# Patient Record
Sex: MALE | Race: WHITE | NOT HISPANIC OR LATINO | Employment: STUDENT | URBAN - METROPOLITAN AREA
[De-identification: names, ages, dates, MRNs, and addresses within clinical notes are randomized per-mention and may not be internally consistent; named-entity substitution may affect disease eponyms.]

---

## 2023-10-05 ENCOUNTER — HOSPITAL ENCOUNTER (EMERGENCY)
Facility: HOSPITAL | Age: 18
Discharge: HOME | End: 2023-10-05
Payer: COMMERCIAL

## 2023-10-05 VITALS
OXYGEN SATURATION: 98 % | SYSTOLIC BLOOD PRESSURE: 147 MMHG | WEIGHT: 185 LBS | RESPIRATION RATE: 18 BRPM | TEMPERATURE: 97.5 F | HEART RATE: 68 BPM | DIASTOLIC BLOOD PRESSURE: 77 MMHG | BODY MASS INDEX: 27.4 KG/M2 | HEIGHT: 69 IN

## 2023-10-05 DIAGNOSIS — L05.01 PILONIDAL CYST WITH ABSCESS: Primary | ICD-10-CM

## 2023-10-05 PROCEDURE — 99283 EMERGENCY DEPT VISIT LOW MDM: CPT | Mod: 25

## 2023-10-05 PROCEDURE — 2500000005 HC RX 250 GENERAL PHARMACY W/O HCPCS: Performed by: NURSE PRACTITIONER

## 2023-10-05 PROCEDURE — 10080 I&D PILONIDAL CYST SIMPLE: CPT | Performed by: NURSE PRACTITIONER

## 2023-10-05 PROCEDURE — 87185 SC STD ENZYME DETCJ PER NZM: CPT | Mod: CMCLAB,PARLAB | Performed by: NURSE PRACTITIONER

## 2023-10-05 PROCEDURE — 99283 EMERGENCY DEPT VISIT LOW MDM: CPT | Mod: 25 | Performed by: NURSE PRACTITIONER

## 2023-10-05 RX ORDER — DOXYCYCLINE HYCLATE 100 MG
100 TABLET ORAL ONCE
Status: DISCONTINUED | OUTPATIENT
Start: 2023-10-05 | End: 2023-10-06 | Stop reason: HOSPADM

## 2023-10-05 RX ORDER — LIDOCAINE HYDROCHLORIDE AND EPINEPHRINE 10; 10 MG/ML; UG/ML
5 INJECTION, SOLUTION INFILTRATION; PERINEURAL ONCE
Status: COMPLETED | OUTPATIENT
Start: 2023-10-05 | End: 2023-10-05

## 2023-10-05 RX ORDER — DOXYCYCLINE 100 MG/1
100 CAPSULE ORAL 2 TIMES DAILY
Qty: 20 CAPSULE | Refills: 0 | Status: SHIPPED | OUTPATIENT
Start: 2023-10-05 | End: 2023-10-15

## 2023-10-05 RX ORDER — OXYCODONE AND ACETAMINOPHEN 5; 325 MG/1; MG/1
1 TABLET ORAL ONCE
Status: DISCONTINUED | OUTPATIENT
Start: 2023-10-05 | End: 2023-10-06 | Stop reason: HOSPADM

## 2023-10-05 RX ADMIN — LIDOCAINE HYDROCHLORIDE,EPINEPHRINE BITARTRATE 5 ML: 10; .01 INJECTION, SOLUTION INFILTRATION; PERINEURAL at 22:10

## 2023-10-05 ASSESSMENT — LIFESTYLE VARIABLES
HAVE PEOPLE ANNOYED YOU BY CRITICIZING YOUR DRINKING: NO
HAVE YOU EVER FELT YOU SHOULD CUT DOWN ON YOUR DRINKING: NO
EVER FELT BAD OR GUILTY ABOUT YOUR DRINKING: NO
EVER HAD A DRINK FIRST THING IN THE MORNING TO STEADY YOUR NERVES TO GET RID OF A HANGOVER: NO

## 2023-10-05 ASSESSMENT — PAIN SCALES - GENERAL: PAINLEVEL_OUTOF10: 1

## 2023-10-05 ASSESSMENT — COLUMBIA-SUICIDE SEVERITY RATING SCALE - C-SSRS
6. HAVE YOU EVER DONE ANYTHING, STARTED TO DO ANYTHING, OR PREPARED TO DO ANYTHING TO END YOUR LIFE?: NO
1. IN THE PAST MONTH, HAVE YOU WISHED YOU WERE DEAD OR WISHED YOU COULD GO TO SLEEP AND NOT WAKE UP?: NO
2. HAVE YOU ACTUALLY HAD ANY THOUGHTS OF KILLING YOURSELF?: NO

## 2023-10-05 ASSESSMENT — PAIN DESCRIPTION - PROGRESSION: CLINICAL_PROGRESSION: NOT CHANGED

## 2023-10-05 ASSESSMENT — PAIN - FUNCTIONAL ASSESSMENT: PAIN_FUNCTIONAL_ASSESSMENT: 0-10

## 2023-10-06 NOTE — ED PROVIDER NOTES
HPI   Chief Complaint   Patient presents with    Abscess     Pt has abscess at sacrum       Patient is a 18-year-old male who presents ED today due to a painful lump near his gluteal cleft.  Patient states that started about 3 days ago and became exquisitely painful over the past 2 days.  Patient is taking dual action ibuprofen/Tylenol with limited relief.  Patient thinks he might have a pilonidal cyst.  Patient denies any fevers, chills, or previous episodes of this.       used: No                        No data recorded                Patient History   No past medical history on file.  No past surgical history on file.  No family history on file.  Social History     Tobacco Use    Smoking status: Not on file    Smokeless tobacco: Not on file   Substance Use Topics    Alcohol use: Not on file    Drug use: Not on file       Physical Exam   ED Triage Vitals   Temp Heart Rate Resp BP   10/05/23 2114 10/05/23 2114 10/05/23 2114 10/05/23 2117   36.4 °C (97.5 °F) 68 18 147/77      SpO2 Temp src Heart Rate Source Patient Position   10/05/23 2114 -- -- --   98 %         BP Location FiO2 (%)     -- --             Physical Exam  Vitals and nursing note reviewed.   Constitutional:       General: He is not in acute distress.     Appearance: He is well-developed.   HENT:      Head: Normocephalic and atraumatic.   Eyes:      Conjunctiva/sclera: Conjunctivae normal.   Cardiovascular:      Rate and Rhythm: Normal rate and regular rhythm.      Heart sounds: No murmur heard.  Pulmonary:      Effort: Pulmonary effort is normal. No respiratory distress.      Breath sounds: Normal breath sounds.   Abdominal:      Palpations: Abdomen is soft.      Tenderness: There is no abdominal tenderness.   Musculoskeletal:         General: No swelling.      Cervical back: Neck supple.   Skin:     General: Skin is warm and dry.      Capillary Refill: Capillary refill takes less than 2 seconds.             Comments: 1 cm x 2 and  half centimeter ovoid area of erythema with fluctuance, does not appear to extend to the anal region, appears fairly superficial.   Neurological:      Mental Status: He is alert.   Psychiatric:         Mood and Affect: Mood normal.       ED Course & MDM   Diagnoses as of 10/05/23 2243   Pilonidal cyst with abscess       Medical Decision Making  Differential diagnosis: Pilonidal cyst, infected pilonidal cyst, folliculitis, abscess, cellulitis.    Amount and/or Complexity of Data Reviewed  Labs: ordered.     Details: Abscess culture pending    Risk  OTC drugs.  Prescription drug management.      Procedure  Incision and Drainage    Performed by: JAYY Simpson  Authorized by: JYAY Simpson    Consent:     Consent obtained:  Verbal    Consent given by:  Patient    Risks, benefits, and alternatives were discussed: yes      Risks discussed:  Bleeding, incomplete drainage, pain, damage to other organs and infection    Alternatives discussed:  No treatment, delayed treatment, alternative treatment, observation and referral  Universal protocol:     Procedure explained and questions answered to patient or proxy's satisfaction: yes      Relevant documents present and verified: yes      Test results available : yes      Required blood products, implants, devices, and special equipment available: yes      Site/side marked: yes      Immediately prior to procedure, a time out was called: yes      Patient identity confirmed:  Verbally with patient and arm band  Location:     Type:  Pilonidal cyst (Infected)    Size:  See note    Location:  Trunk    Trunk location:  Back  Pre-procedure details:     Skin preparation:  Chlorhexidine  Anesthesia:     Anesthesia method:  Local infiltration    Local anesthetic:  Lidocaine 1% WITH epi  Procedure type:     Complexity:  Simple  Procedure details:     Ultrasound guidance: no      Incision types:  Stab incision    Incision depth:  Dermal    Wound management:  Probed and  deloculated    Drainage:  Bloody and purulent    Drainage amount:  Moderate    Wound treatment:  Wound left open    Packing materials:  1/4 in iodoform gauze  Post-procedure details:     Procedure completion:  Tolerated well, no immediate complications       GENESIS Simpson-CNP  10/05/23 2279

## 2023-10-07 ENCOUNTER — HOSPITAL ENCOUNTER (EMERGENCY)
Facility: HOSPITAL | Age: 18
Discharge: HOME | End: 2023-10-07
Payer: COMMERCIAL

## 2023-10-07 VITALS
DIASTOLIC BLOOD PRESSURE: 72 MMHG | TEMPERATURE: 97.5 F | HEART RATE: 75 BPM | BODY MASS INDEX: 27.4 KG/M2 | WEIGHT: 185 LBS | OXYGEN SATURATION: 99 % | HEIGHT: 69 IN | RESPIRATION RATE: 18 BRPM | SYSTOLIC BLOOD PRESSURE: 136 MMHG

## 2023-10-07 DIAGNOSIS — Z51.89 VISIT FOR WOUND CHECK: Primary | ICD-10-CM

## 2023-10-07 PROCEDURE — 99281 EMR DPT VST MAYX REQ PHY/QHP: CPT

## 2023-10-07 ASSESSMENT — COLUMBIA-SUICIDE SEVERITY RATING SCALE - C-SSRS
2. HAVE YOU ACTUALLY HAD ANY THOUGHTS OF KILLING YOURSELF?: NO
6. HAVE YOU EVER DONE ANYTHING, STARTED TO DO ANYTHING, OR PREPARED TO DO ANYTHING TO END YOUR LIFE?: NO
1. IN THE PAST MONTH, HAVE YOU WISHED YOU WERE DEAD OR WISHED YOU COULD GO TO SLEEP AND NOT WAKE UP?: NO

## 2023-10-07 ASSESSMENT — PAIN SCALES - GENERAL
PAINLEVEL_OUTOF10: 0 - NO PAIN
PAINLEVEL_OUTOF10: 3

## 2023-10-07 ASSESSMENT — PAIN - FUNCTIONAL ASSESSMENT: PAIN_FUNCTIONAL_ASSESSMENT: 0-10

## 2023-10-08 LAB
B-LACTAMASE ORGANISM ISLT: NEGATIVE
BACTERIA SPEC CULT: ABNORMAL
BACTERIA SPEC CULT: ABNORMAL
GRAM STN SPEC: ABNORMAL
GRAM STN SPEC: ABNORMAL

## 2023-10-08 NOTE — ED PROVIDER NOTES
HPI   Chief Complaint   Patient presents with    Wound Check     Pt was here two days ago and had a cyst on his buttock drained and packed, was advised to come here if unable to receive care elsewhere.        18-year-old male here today for wound check.  The patient states that he had an abscess drained and packed on the fifth of this month.  He reports that he was instructed to have the packing removed in the next 2 days.  Reports he was not able to get into an urgent care or his primary care physician's office.  He states improvement of his symptoms and continues to take antibiotics       used: No                        No data recorded                Patient History   No past medical history on file.  No past surgical history on file.  No family history on file.  Social History     Tobacco Use    Smoking status: Not on file    Smokeless tobacco: Not on file   Substance Use Topics    Alcohol use: Not on file    Drug use: Not on file       Physical Exam   ED Triage Vitals [10/07/23 2044]   Temp Heart Rate Resp BP   36.3 °C (97.3 °F) 85 20 164/81      SpO2 Temp Source Heart Rate Source Patient Position   97 % Temporal Monitor Sitting      BP Location FiO2 (%)     Right arm --       Physical Exam  Vitals and nursing note reviewed.   Constitutional:       General: He is not in acute distress.     Appearance: Normal appearance. He is normal weight. He is not ill-appearing, toxic-appearing or diaphoretic.   HENT:      Head: Normocephalic.      Nose: Nose normal.      Mouth/Throat:      Mouth: Mucous membranes are moist.   Eyes:      Extraocular Movements: Extraocular movements intact.      Conjunctiva/sclera: Conjunctivae normal.   Cardiovascular:      Rate and Rhythm: Normal rate and regular rhythm.      Pulses: Normal pulses.   Pulmonary:      Effort: Pulmonary effort is normal. No respiratory distress.      Breath sounds: Normal breath sounds.   Musculoskeletal:         General: Normal range of  motion.      Cervical back: Normal range of motion and neck supple.   Skin:     General: Skin is warm and dry.      Capillary Refill: Capillary refill takes less than 2 seconds.      Comments: Area of slight induration without fluctuance to the gluteal cleft   Neurological:      General: No focal deficit present.      Mental Status: He is alert and oriented to person, place, and time.   Psychiatric:         Mood and Affect: Mood normal.         Behavior: Behavior normal.         Thought Content: Thought content normal.         Judgment: Judgment normal.         ED Course & MDM   Diagnoses as of 10/07/23 2117   Visit for wound check       Medical Decision Making  Patient's wound appears to be healing nicely.  Packing was removed.  Patient is afebrile.  Patient instructed to continue using warm soaks and compresses and was encouraged to follow-up with the wound care clinic        Procedure  Procedures     Vern Adair PA-C  10/07/23 2121

## 2023-10-09 ENCOUNTER — TELEPHONE (OUTPATIENT)
Dept: PHARMACY | Facility: HOSPITAL | Age: 18
End: 2023-10-09
Payer: COMMERCIAL

## 2023-10-09 NOTE — PROGRESS NOTES
EDPD Note: Antibiotics Reviewed and Warranted    Contacted Mr./Mrs./Ms. Ralph Balbuena regarding a positive wound/abscess culture/result that was taken during their recent emergency room visit. I completed education with patient and father (with patient's consent) . The patient is being treated appropriately with doxycycline 100mg by mouth twice daily for 10 days. Patient states wound has been improving. Had follow up appointment yesterday to remove dressings, and was told the wound looks improved. Educated to finish course of antibiotics and continue follow up with PCP.    Susceptibility data from last 90 days.  Collected Specimen Info Organism   10/05/23 Tissue/Biopsy from Skin/Superficial Abscess Mixed Skin Microorganisms      Mixed Gram-Positive and Gram-Negative Bacteria        No further follow up needed from EDPD Team.     Yolanda Hill, Pharm D  PGY-1 Pharmacy Resident, [facility]

## 2023-10-12 ENCOUNTER — CLINICAL SUPPORT (OUTPATIENT)
Dept: WOUND CARE | Facility: CLINIC | Age: 18
End: 2023-10-12
Payer: COMMERCIAL

## 2023-10-12 PROCEDURE — 97597 DBRDMT OPN WND 1ST 20 CM/<: CPT

## 2023-10-12 PROCEDURE — 99212 OFFICE O/P EST SF 10 MIN: CPT | Performed by: NURSE PRACTITIONER

## 2023-10-12 PROCEDURE — 97597 DBRDMT OPN WND 1ST 20 CM/<: CPT | Performed by: NURSE PRACTITIONER

## 2023-10-12 PROCEDURE — 99212 OFFICE O/P EST SF 10 MIN: CPT

## 2023-10-19 ENCOUNTER — APPOINTMENT (OUTPATIENT)
Dept: WOUND CARE | Facility: CLINIC | Age: 18
End: 2023-10-19
Payer: COMMERCIAL

## 2025-03-25 ENCOUNTER — APPOINTMENT (OUTPATIENT)
Dept: RADIOLOGY | Facility: HOSPITAL | Age: 20
End: 2025-03-25
Payer: COMMERCIAL

## 2025-03-25 ENCOUNTER — HOSPITAL ENCOUNTER (EMERGENCY)
Facility: HOSPITAL | Age: 20
Discharge: HOME | End: 2025-03-25
Attending: EMERGENCY MEDICINE
Payer: COMMERCIAL

## 2025-03-25 VITALS
SYSTOLIC BLOOD PRESSURE: 156 MMHG | HEIGHT: 69 IN | BODY MASS INDEX: 27.4 KG/M2 | WEIGHT: 185 LBS | DIASTOLIC BLOOD PRESSURE: 83 MMHG | TEMPERATURE: 96.8 F | RESPIRATION RATE: 18 BRPM | OXYGEN SATURATION: 98 % | HEART RATE: 91 BPM
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
## 2025-03-25 DIAGNOSIS — E87.6 HYPOKALEMIA: ICD-10-CM

## 2025-03-25 DIAGNOSIS — S09.90XA CLOSED HEAD INJURY, INITIAL ENCOUNTER: ICD-10-CM

## 2025-03-25 LAB
ANION GAP SERPL CALC-SCNC: 14 MMOL/L (ref 10–20)
BASOPHILS # BLD AUTO: 0.05 X10*3/UL (ref 0–0.1)
BASOPHILS NFR BLD AUTO: 0.4 %
BUN SERPL-MCNC: 13 MG/DL (ref 6–23)
CALCIUM SERPL-MCNC: 9.8 MG/DL (ref 8.6–10.3)
CHLORIDE SERPL-SCNC: 100 MMOL/L (ref 98–107)
CO2 SERPL-SCNC: 29 MMOL/L (ref 21–32)
CREAT SERPL-MCNC: 0.76 MG/DL (ref 0.5–1.3)
EGFRCR SERPLBLD CKD-EPI 2021: >90 ML/MIN/1.73M*2
EOSINOPHIL # BLD AUTO: 0.1 X10*3/UL (ref 0–0.7)
EOSINOPHIL NFR BLD AUTO: 0.8 %
ERYTHROCYTE [DISTWIDTH] IN BLOOD BY AUTOMATED COUNT: 12.2 % (ref 11.5–14.5)
GLUCOSE SERPL-MCNC: 119 MG/DL (ref 74–99)
HCT VFR BLD AUTO: 49.2 % (ref 41–52)
HGB BLD-MCNC: 16.2 G/DL (ref 13.5–17.5)
IMM GRANULOCYTES # BLD AUTO: 0.13 X10*3/UL (ref 0–0.7)
IMM GRANULOCYTES NFR BLD AUTO: 1.1 % (ref 0–0.9)
LYMPHOCYTES # BLD AUTO: 2.01 X10*3/UL (ref 1.2–4.8)
LYMPHOCYTES NFR BLD AUTO: 17.1 %
MAGNESIUM SERPL-MCNC: 1.95 MG/DL (ref 1.6–2.4)
MCH RBC QN AUTO: 30.6 PG (ref 26–34)
MCHC RBC AUTO-ENTMCNC: 32.9 G/DL (ref 32–36)
MCV RBC AUTO: 93 FL (ref 80–100)
MONOCYTES # BLD AUTO: 0.91 X10*3/UL (ref 0.1–1)
MONOCYTES NFR BLD AUTO: 7.7 %
NEUTROPHILS # BLD AUTO: 8.58 X10*3/UL (ref 1.2–7.7)
NEUTROPHILS NFR BLD AUTO: 72.9 %
NRBC BLD-RTO: 0 /100 WBCS (ref 0–0)
PLATELET # BLD AUTO: 308 X10*3/UL (ref 150–450)
POTASSIUM SERPL-SCNC: 3.4 MMOL/L (ref 3.5–5.3)
RBC # BLD AUTO: 5.3 X10*6/UL (ref 4.5–5.9)
SODIUM SERPL-SCNC: 140 MMOL/L (ref 136–145)
WBC # BLD AUTO: 11.8 X10*3/UL (ref 4.4–11.3)

## 2025-03-25 PROCEDURE — 2500000002 HC RX 250 W HCPCS SELF ADMINISTERED DRUGS (ALT 637 FOR MEDICARE OP, ALT 636 FOR OP/ED): Performed by: EMERGENCY MEDICINE

## 2025-03-25 PROCEDURE — 80048 BASIC METABOLIC PNL TOTAL CA: CPT | Performed by: NURSE PRACTITIONER

## 2025-03-25 PROCEDURE — 85025 COMPLETE CBC W/AUTO DIFF WBC: CPT | Performed by: NURSE PRACTITIONER

## 2025-03-25 PROCEDURE — 72125 CT NECK SPINE W/O DYE: CPT | Performed by: RADIOLOGY

## 2025-03-25 PROCEDURE — 99284 EMERGENCY DEPT VISIT MOD MDM: CPT | Mod: 25 | Performed by: EMERGENCY MEDICINE

## 2025-03-25 PROCEDURE — 36415 COLL VENOUS BLD VENIPUNCTURE: CPT | Performed by: NURSE PRACTITIONER

## 2025-03-25 PROCEDURE — 72125 CT NECK SPINE W/O DYE: CPT

## 2025-03-25 PROCEDURE — 83735 ASSAY OF MAGNESIUM: CPT | Performed by: NURSE PRACTITIONER

## 2025-03-25 PROCEDURE — 70450 CT HEAD/BRAIN W/O DYE: CPT | Performed by: RADIOLOGY

## 2025-03-25 PROCEDURE — 70450 CT HEAD/BRAIN W/O DYE: CPT

## 2025-03-25 RX ORDER — POTASSIUM CHLORIDE 20 MEQ/1
20 TABLET, EXTENDED RELEASE ORAL ONCE
Status: COMPLETED | OUTPATIENT
Start: 2025-03-25 | End: 2025-03-25

## 2025-03-25 RX ADMIN — POTASSIUM CHLORIDE 20 MEQ: 1500 TABLET, EXTENDED RELEASE ORAL at 17:58

## 2025-03-25 ASSESSMENT — COLUMBIA-SUICIDE SEVERITY RATING SCALE - C-SSRS
1. IN THE PAST MONTH, HAVE YOU WISHED YOU WERE DEAD OR WISHED YOU COULD GO TO SLEEP AND NOT WAKE UP?: NO
6. HAVE YOU EVER DONE ANYTHING, STARTED TO DO ANYTHING, OR PREPARED TO DO ANYTHING TO END YOUR LIFE?: NO
2. HAVE YOU ACTUALLY HAD ANY THOUGHTS OF KILLING YOURSELF?: NO

## 2025-03-25 NOTE — ED PROVIDER NOTES
HPI   Chief Complaint   Patient presents with    Fall       19yM presents with head injury and vomiting.  Patient lost his balance on the stairs and apparently hit his back and his head.  His father heard a thud and found him on the ground where he was unresponsive for only a few seconds.  Patient initially complained of pain to his lower back and his posterior occiput which has improved.  He had several episodes of vomiting afterwards, instructed his father to take him to the ED.  Father notes that the patient had a similar episode of syncope years ago as a child he also got up after playing several hours of video games.  Father also notes the patient tends to vomit after stressful or anxiety provoking events.      History provided by:  Patient and parent   used: No            Patient History   No past medical history on file.  No past surgical history on file.  No family history on file.  Social History     Tobacco Use    Smoking status: Not on file    Smokeless tobacco: Not on file   Substance Use Topics    Alcohol use: Not on file    Drug use: Not on file       Physical Exam   ED Triage Vitals [03/25/25 1610]   Temperature Heart Rate Respirations BP   36 °C (96.8 °F) 91 18 156/83      Pulse Ox Temp src Heart Rate Source Patient Position   98 % -- -- --      BP Location FiO2 (%)     -- --       Physical Exam  Vitals and nursing note reviewed.   Constitutional:       General: He is not in acute distress.     Appearance: Normal appearance. He is not ill-appearing, toxic-appearing or diaphoretic.   HENT:      Head: Normocephalic.      Comments: ?small posterior occipital scalp hematoma     Nose: Nose normal. No congestion or rhinorrhea.      Mouth/Throat:      Mouth: Mucous membranes are moist.   Eyes:      General: No scleral icterus.        Right eye: No discharge.         Left eye: No discharge.      Extraocular Movements: Extraocular movements intact.      Conjunctiva/sclera: Conjunctivae  normal.   Cardiovascular:      Rate and Rhythm: Normal rate and regular rhythm.      Heart sounds: No murmur heard.     No friction rub. No gallop.   Pulmonary:      Effort: Pulmonary effort is normal. No respiratory distress.      Breath sounds: Normal breath sounds. No stridor. No wheezing, rhonchi or rales.   Abdominal:      General: There is no distension.      Palpations: Abdomen is soft.      Tenderness: There is no abdominal tenderness. There is no guarding or rebound.   Musculoskeletal:         General: No swelling, tenderness, deformity or signs of injury. Normal range of motion.      Cervical back: Normal range of motion and neck supple. No rigidity or tenderness.   Skin:     General: Skin is warm and dry.      Coloration: Skin is not jaundiced or pale.      Findings: No bruising, erythema, lesion or rash.   Neurological:      General: No focal deficit present.      Mental Status: He is alert and oriented to person, place, and time.      Cranial Nerves: No cranial nerve deficit.      Sensory: No sensory deficit.      Motor: No weakness.      Coordination: Coordination normal.      Gait: Gait normal.      Comments: No truncal ataxia or dysmetria, normal gait, normal test of skew   Psychiatric:         Mood and Affect: Mood normal.         Behavior: Behavior normal.           ED Course & MDM   Diagnoses as of 03/26/25 1558   Fall, initial encounter   Closed head injury, initial encounter   Hypokalemia                 No data recorded     [place] Coma Scale Score: 15 (03/25/25 1609 : Radha Jhaveri RN)                           Medical Decision Making  19yM presents with several episodes of vomiting after falling on the stairs and hitting his head.  No focal neurologic deficit, including GSC 15, no cerebellar signs, no resp distress, abd soft/benign with no concern for intrathoracic or intraabdominal injury.  CTH and c-spine without acute pathology.  Labs reassuring aside from mild hypokalemia,  likely from several episodes of vomiting.  Pt now tolerating PO.  Ok to dc with supportive care for possible concussion, outpatient follow up and return precautions.    Amount and/or Complexity of Data Reviewed  Labs: ordered.  Radiology: ordered.    Risk  Prescription drug management.        Procedure  Procedures     Elyse H Klerman, MD  03/26/25 5961

## 2025-03-25 NOTE — ED TRIAGE NOTES
Secondary to patient volumes and overcrowding, I performed a brief medical screening exam of the patient in triage, as the patient awaits space in the main ED.    History of Present Illness:  Ralph Balbuena presents with   Chief Complaint   Patient presents with    Fall       Physical Exam:  General - In no acute distress  Respiratory - Breathing comfortably  Cardiac - Normal S1, S2, no m/g/r  Neurologic - Moving all extremities      Medical Decision Making:  Patient will require further evaluation in the main ED.    Initial diagnostic tests were ordered from triage.      The patient demonstrates understanding that this initial evaluation is a brief medical screening exam and the expectation is that they await for space in the main ED to be further evaluated.  The patient understands that, if they leave prior to further evaluation in the main ED after this initial evaluation in triage, they are doing so under their own accord knowing that their evaluation/work-up is not yet complete. The patient also understands that any preliminary diagnostic results, including abnormalities, may not be shared with them, if they choose to leave prior to further evaluation in the main ED.

## 2025-03-25 NOTE — ED TRIAGE NOTES
Patient states he was trying to ambulate from steps to landing, approx 3 steps, missed a step and then fell and struck his head. Dad was there and witnessed patient unconscious after striking his head. Patient states he had some vomiting this morning and then following waking up after fall he continued to vomit. Patient also hit his back during the fall.

## 2025-03-25 NOTE — DISCHARGE INSTRUCTIONS
You were seen in the ED for head injury, vomiting and passing out after a fall.  CT does not show any injury.  Labs showed borderline low potassium, which can be seen after vomiting.  While your body will slowly replenish its potassium levels once you are eating again, we gave you a small dose of oral potassium which will replete your levels faster.  Please follow up with your PCP if you continue to have symptoms after 2-3 days.  Return to the ED for further concerns.